# Patient Record
Sex: FEMALE | Race: WHITE | NOT HISPANIC OR LATINO | URBAN - METROPOLITAN AREA
[De-identification: names, ages, dates, MRNs, and addresses within clinical notes are randomized per-mention and may not be internally consistent; named-entity substitution may affect disease eponyms.]

---

## 2021-03-05 ENCOUNTER — IMPORTED ENCOUNTER (OUTPATIENT)
Dept: URBAN - METROPOLITAN AREA CLINIC 31 | Facility: CLINIC | Age: 71
End: 2021-03-05

## 2021-03-05 PROBLEM — H43.811: Noted: 2021-03-05

## 2021-03-05 PROBLEM — H25.813: Noted: 2021-03-05

## 2021-03-05 PROCEDURE — 92015 DETERMINE REFRACTIVE STATE: CPT

## 2021-03-05 PROCEDURE — 99204 OFFICE O/P NEW MOD 45 MIN: CPT

## 2021-03-05 NOTE — PATIENT DISCUSSION
1.  Discussed the risks benefits alternatives and limitations of cataract surgery including infection bleeding loss of vision retinal tears detachment. The patient stated a full understanding and a desire to proceed with the procedure in both eyes. Refractive options were reviewed. Patient has elected to be optimized for distance vision in both eyes. The patient will still need glasses for reading and to possibly fine tune distance vision. Schedule KPE/IOL OD/OS. MFIOL mentioned2. PVD OD: Patient was cautioned to call our office immediately if they experience a substantial change in their symptoms such as an increase in floaters persistent flashes loss of visual field (may appear as a shadow or a curtain) or decrease in visual acuity as these may indicate a retinal tear or detachment. If this is a new problem patient will need to return for re-examination  as determined by the physicianReturn for an appointment for [address] [place] [address] with Dr. Diana Kennedy.

## 2021-04-14 ENCOUNTER — IMPORTED ENCOUNTER (OUTPATIENT)
Dept: URBAN - METROPOLITAN AREA CLINIC 31 | Facility: CLINIC | Age: 71
End: 2021-04-14

## 2021-04-14 PROCEDURE — 92136 OPHTHALMIC BIOMETRY: CPT

## 2021-04-22 ENCOUNTER — IMPORTED ENCOUNTER (OUTPATIENT)
Dept: URBAN - METROPOLITAN AREA CLINIC 31 | Facility: CLINIC | Age: 71
End: 2021-04-22

## 2021-04-22 PROBLEM — Z96.1: Noted: 2021-04-22

## 2021-04-22 PROCEDURE — 99024 POSTOP FOLLOW-UP VISIT: CPT

## 2021-04-22 NOTE — PATIENT DISCUSSION
Post-Op Day #1 - Cataract Surgery Right Eye (OD) - doing well. Tears prn. Continue postop drops as directed. Call office with symptoms of pain redness or decreased vision in operative eye. Keep scheduled appointment.

## 2021-04-29 ENCOUNTER — IMPORTED ENCOUNTER (OUTPATIENT)
Dept: URBAN - METROPOLITAN AREA CLINIC 31 | Facility: CLINIC | Age: 71
End: 2021-04-29

## 2021-04-29 PROBLEM — Z96.1: Noted: 2021-04-29

## 2021-04-29 PROCEDURE — 99024 POSTOP FOLLOW-UP VISIT: CPT

## 2021-04-29 NOTE — PATIENT DISCUSSION
1.  Post-Op Day #1 - Cataract Surgery Left Eye (OS) - doing well. Tears prn. Continue postop drops as directed. Call office with symptoms of pain redness or decreased vision in operative eye. 2.  Post-Op Week #1 - Cataract Surgery Right Eye (OD) - Intraocular lens stable and surgery very well healed. Patient to resume all normal activities. Finish postop drops as directed. Final Refraction given if necessary. Call with any problems.

## 2021-05-06 ENCOUNTER — IMPORTED ENCOUNTER (OUTPATIENT)
Dept: URBAN - METROPOLITAN AREA CLINIC 31 | Facility: CLINIC | Age: 71
End: 2021-05-06

## 2021-05-06 PROBLEM — Z96.1: Noted: 2021-05-06

## 2021-05-06 PROCEDURE — 99024 POSTOP FOLLOW-UP VISIT: CPT

## 2021-05-06 NOTE — PATIENT DISCUSSION
Post-Op Cataract Surgery 15-90 days Both Eyes (OU)-  Doing well with stable vision. Monitor for changesReturn for an appointment in 5 months for dilated fundus exam. with Dr. Jim Dutton.

## 2021-11-11 ENCOUNTER — IMPORTED ENCOUNTER (OUTPATIENT)
Dept: URBAN - METROPOLITAN AREA CLINIC 31 | Facility: CLINIC | Age: 71
End: 2021-11-11

## 2021-11-11 PROBLEM — Z96.1: Noted: 2021-11-11

## 2021-11-11 PROBLEM — H40.011: Noted: 2021-11-11

## 2021-11-11 PROCEDURE — 99214 OFFICE O/P EST MOD 30 MIN: CPT

## 2021-11-11 PROCEDURE — 92015 DETERMINE REFRACTIVE STATE: CPT

## 2021-11-11 PROCEDURE — 92133 CPTRZD OPH DX IMG PST SGM ON: CPT

## 2021-11-11 NOTE — PATIENT DISCUSSION
1.  Pseudophakia OU - IOLs stable. Monitor for changes in vision. 2. Glaucoma suspect OD -  C/D asymmetry. Nerve OCT todayReturn for an appointment in 6 months for pressure check. VF 24-2. with Dr. sOiris Aldridge.

## 2021-12-09 ENCOUNTER — IMPORTED ENCOUNTER (OUTPATIENT)
Dept: URBAN - METROPOLITAN AREA CLINIC 31 | Facility: CLINIC | Age: 71
End: 2021-12-09

## 2021-12-09 PROBLEM — Z96.1: Noted: 2021-12-09

## 2021-12-09 PROBLEM — H43.811: Noted: 2021-12-09

## 2021-12-09 PROCEDURE — 99214 OFFICE O/P EST MOD 30 MIN: CPT

## 2021-12-09 NOTE — PATIENT DISCUSSION
1.  PVD OD: post fall NEW  w few small pre retinal hemorrhages inferior  F/U 1 MO. DF.Patient was cautioned to call our office immediately if they experience a substantial change in their symptoms such as an increase in floaters persistent flashes loss of visual field (may appear as a shadow or a curtain) or decrease in visual acuity as these may indicate a retinal tear or detachment. If this is a new problem patient will need to return for re-examination  as determined by the [address]. Pseudophakia OU - IOLs stable. Monitor for changes in vision. Return for an appointment in 1 month for dilated fundus exam. Dilate OD only F/U new PVD. with Dr. Rosa Shi.

## 2022-04-02 ASSESSMENT — VISUAL ACUITY
OS_SC: J2
OS_CC: 20/20
OS_GLARE: 20/60MED
OS_CC: 20/20-2
OD_CC: 20/40
OD_CC: 20/25+1
OS_CC: J2
OS_CC: 20/25-1
OD_CC: 20/40
OD_PH: SC 20/25 -2
OD_CC: 20/50-2
OD_CC: 20/25-1
OD_CC: 20/20-1
OD_PH: SC 20/25
OS_CC: 20/40
OD_PH: SC 20/25
OS_CC: 20/30+2
OS_CC: 20/25-2
OD_SC: J2
OD_CC: J3
OS_GLARE: 20/30
OD_GLARE: 20/50MED
OD_GLARE: 20/30

## 2022-04-02 ASSESSMENT — TONOMETRY
OD_IOP_MMHG: 20
OS_IOP_MMHG: 21
OS_IOP_MMHG: 21
OD_IOP_MMHG: 17
OS_IOP_MMHG: 11
OS_IOP_MMHG: 12
OD_IOP_MMHG: 18
OD_IOP_MMHG: 13
OD_IOP_MMHG: 12
OD_IOP_MMHG: 22
OS_IOP_MMHG: 16

## 2024-07-29 ENCOUNTER — TELEPHONE (OUTPATIENT)
Dept: ORTHOPEDIC SURGERY | Age: 74
End: 2024-07-29

## 2024-07-29 ENCOUNTER — OFFICE VISIT (OUTPATIENT)
Dept: ORTHOPEDIC SURGERY | Age: 74
End: 2024-07-29
Payer: MEDICARE

## 2024-07-29 VITALS — HEIGHT: 65 IN | BODY MASS INDEX: 18.76 KG/M2 | WEIGHT: 112.6 LBS

## 2024-07-29 DIAGNOSIS — M25.551 PAIN OF RIGHT HIP: ICD-10-CM

## 2024-07-29 DIAGNOSIS — M70.61 GREATER TROCHANTERIC BURSITIS OF BOTH HIPS: ICD-10-CM

## 2024-07-29 DIAGNOSIS — M25.552 PAIN OF LEFT HIP: Primary | ICD-10-CM

## 2024-07-29 DIAGNOSIS — M67.952 TENDINOPATHY OF LEFT GLUTEUS MEDIUS: ICD-10-CM

## 2024-07-29 DIAGNOSIS — M70.62 GREATER TROCHANTERIC BURSITIS OF BOTH HIPS: ICD-10-CM

## 2024-07-29 PROCEDURE — 99203 OFFICE O/P NEW LOW 30 MIN: CPT | Performed by: PHYSICIAN ASSISTANT

## 2024-07-29 PROCEDURE — 1123F ACP DISCUSS/DSCN MKR DOCD: CPT | Performed by: PHYSICIAN ASSISTANT

## 2024-07-29 RX ORDER — EZETIMIBE 10 MG/1
10 TABLET ORAL DAILY
COMMUNITY

## 2024-07-29 RX ORDER — DILTIAZEM HYDROCHLORIDE 120 MG/1
120 CAPSULE, EXTENDED RELEASE ORAL 2 TIMES DAILY
COMMUNITY

## 2024-07-29 RX ORDER — ASPIRIN 81 MG/1
81 TABLET ORAL DAILY
COMMUNITY

## 2024-07-29 RX ORDER — LORAZEPAM 1 MG/1
1 TABLET ORAL EVERY 6 HOURS PRN
COMMUNITY

## 2024-07-29 ASSESSMENT — ENCOUNTER SYMPTOMS
SHORTNESS OF BREATH: 0
COUGH: 0
COLOR CHANGE: 0
VOMITING: 0

## 2024-07-29 NOTE — PROGRESS NOTES
[facility]  [address]  [facility] [address]  Dept: [phone]    Ambulatory Orthopedic New Patient Visit      CHIEF COMPLAINT:    Chief Complaint   Patient presents with    Hip Pain     B/L hip pain x's 1 month does not recall injury        HISTORY OF PRESENT ILLNESS:      Srhuthi Juarez presents with ~1 month history of pain in the bilateral hip. She denies a specific trauma or injury to the bilateral hip. The patient was evaluated by her PCP who ordered a Mri of the left hip - which was completed on 6/21/2024. The patient notes that her hip pain does radiate into the thigh and into the groin on the right, but not on the left.  The pain is not  made worse with weightbearing.  The pain is  worse at night.  The pain is  worse when laying on the left.  She denies trauma or injury to either hip.    The patient notes that she was recently diagnosed with thyroid cancer and will be having surgery to have her thyroid removed on 8/12/2024 by a surgeon in [facility].      Past Medical History:    History reviewed. No pertinent past medical history.    Past Surgical History:    History reviewed. No pertinent surgical history.    Current Medications:   Current Outpatient Medications   Medication Sig Dispense Refill    ezetimibe (ZETIA) 10 MG tablet Take 1 tablet by mouth daily      LORazepam (ATIVAN) 1 MG tablet Take 1 tablet by mouth every 6 hours as needed for Anxiety. Max Daily Amount: 4 mg      dilTIAZem (CARDIZEM 12 HR) 120 MG extended release capsule Take 1 capsule by mouth 2 times daily      aspirin 81 MG EC tablet Take 1 tablet by mouth daily       No current facility-administered medications for this visit.       Allergies:    Crestor [rosuvastatin], Lipitor [atorvastatin], Penicillins, and Simvastatin    Social History:   Social History     Socioeconomic History    Marital status:      Spouse name: Not on file    Number of

## 2024-07-30 ENCOUNTER — TELEPHONE (OUTPATIENT)
Dept: ORTHOPEDIC SURGERY | Age: 74
End: 2024-07-30

## 2024-07-30 NOTE — TELEPHONE ENCOUNTER
Patient has a referral from Narcisa Avila and needs it to be faxed to:  [facility], fax number is [phone], Attn: Ne.  Please let patient know when this has been done.  Thanks.